# Patient Record
Sex: FEMALE | Race: WHITE | NOT HISPANIC OR LATINO
[De-identification: names, ages, dates, MRNs, and addresses within clinical notes are randomized per-mention and may not be internally consistent; named-entity substitution may affect disease eponyms.]

---

## 2019-10-03 ENCOUNTER — TRANSCRIPTION ENCOUNTER (OUTPATIENT)
Age: 49
End: 2019-10-03

## 2019-10-04 ENCOUNTER — OUTPATIENT (OUTPATIENT)
Dept: OUTPATIENT SERVICES | Facility: HOSPITAL | Age: 49
LOS: 1 days | End: 2019-10-04
Payer: COMMERCIAL

## 2019-10-04 VITALS
HEIGHT: 62 IN | OXYGEN SATURATION: 100 % | HEART RATE: 52 BPM | DIASTOLIC BLOOD PRESSURE: 78 MMHG | RESPIRATION RATE: 15 BRPM | WEIGHT: 123.02 LBS | TEMPERATURE: 98 F | SYSTOLIC BLOOD PRESSURE: 127 MMHG

## 2019-10-04 VITALS
OXYGEN SATURATION: 100 % | SYSTOLIC BLOOD PRESSURE: 144 MMHG | RESPIRATION RATE: 17 BRPM | DIASTOLIC BLOOD PRESSURE: 80 MMHG | HEART RATE: 68 BPM

## 2019-10-04 DIAGNOSIS — Z98.890 OTHER SPECIFIED POSTPROCEDURAL STATES: Chronic | ICD-10-CM

## 2019-10-04 DIAGNOSIS — H33.011 RETINAL DETACHMENT WITH SINGLE BREAK, RIGHT EYE: ICD-10-CM

## 2019-10-04 DIAGNOSIS — Z41.9 ENCOUNTER FOR PROCEDURE FOR PURPOSES OTHER THAN REMEDYING HEALTH STATE, UNSPECIFIED: Chronic | ICD-10-CM

## 2019-10-04 LAB — HCG UR QL: NEGATIVE — SIGNIFICANT CHANGE UP

## 2019-10-04 PROCEDURE — 67108 REPAIR DETACHED RETINA: CPT | Mod: RT

## 2019-10-04 PROCEDURE — C1889: CPT

## 2019-10-04 PROCEDURE — 81025 URINE PREGNANCY TEST: CPT

## 2019-10-04 PROCEDURE — C1784: CPT

## 2019-10-04 NOTE — ASU DISCHARGE PLAN (ADULT/PEDIATRIC) - ASU DC SPECIAL INSTRUCTIONSFT
face down during day for 3 days, then face down 1 hour 3 times per day for 3 mor days  sleep on right side down on 2 pillows  great neck office tomorrow

## 2019-10-04 NOTE — ASU DISCHARGE PLAN (ADULT/PEDIATRIC) - CALL YOUR DOCTOR IF YOU HAVE ANY OF THE FOLLOWING:
Inability to tolerate liquids or foods/Bleeding that does not stop/Fever greater than (need to indicate Fahrenheit or Celsius)/Swelling that gets worse/Pain not relieved by Medications

## 2019-10-04 NOTE — ASU DISCHARGE PLAN (ADULT/PEDIATRIC) - CARE PROVIDER_API CALL
Bautista Camacho (MD)  Ophthalmology  [address] [place] [address] [id number]  [place] [address]  Phone: [phone]  Fax: [phone]  Follow Up Time:

## 2019-10-04 NOTE — ASU DISCHARGE PLAN (ADULT/PEDIATRIC) - PATIENT EDUCATION MATERIALS PROVIED
Other (specify)/Gas bubble instructions reviewed with good understanding ,gas bubble bracelet on pt. Eye shield with instructions , sunglasses and eye kit given to patient.

## 2024-04-17 NOTE — ASU PATIENT PROFILE, ADULT - NS PRO ABUSE SCREEN AFRAID ANYONE YN
no Bleeding that does not stop/Swelling that gets worse/Pain not relieved by Medications/Fever greater than (need to indicate Fahrenheit or Celsius)/Wound/Surgical Site with redness, or foul smelling discharge or pus/Nausea and vomiting that does not stop/Inability to tolerate liquids or foods

## 2024-11-18 NOTE — ASU DISCHARGE PLAN (ADULT/PEDIATRIC) - PATIENT BELONGINGS
You can access the FollowMyHealth Patient Portal offered by [facility] by registering at the following website: [email or web address]. By joining [employer]’s FollowMyHealth portal, you will also be able to view your health information using other applications (apps) compatible with our system. Patient's belongings returned